# Patient Record
Sex: FEMALE | Race: WHITE | Employment: FULL TIME | URBAN - METROPOLITAN AREA
[De-identification: names, ages, dates, MRNs, and addresses within clinical notes are randomized per-mention and may not be internally consistent; named-entity substitution may affect disease eponyms.]

---

## 2022-06-22 ENCOUNTER — CLINICAL DOCUMENTATION (OUTPATIENT)
Dept: ORTHOPEDIC SURGERY | Age: 67
End: 2022-06-22

## 2022-06-22 NOTE — PROGRESS NOTES
REQUEST FROM JATIN ANDERS AT [facility]. THIS PAITENT HAD A SLIP AND FALL AT SCHOOL 6/21/22 AND NEEDS TO BE SEEN FOR LEFT LEG. JATIN WILL TRY AND GET THE NOTES TO ME BEFORE APPT ON 6/24/22. WC  Gordon Cox  [phone] FAX. [phone]

## 2022-06-24 ENCOUNTER — OFFICE VISIT (OUTPATIENT)
Dept: ORTHOPEDIC SURGERY | Age: 67
End: 2022-06-24

## 2022-06-24 VITALS — HEIGHT: 66 IN | BODY MASS INDEX: 29.73 KG/M2 | WEIGHT: 185 LBS

## 2022-06-24 DIAGNOSIS — S76.302A LEFT HAMSTRING INJURY, INITIAL ENCOUNTER: Primary | ICD-10-CM

## 2022-06-24 RX ORDER — FOLIC ACID 1 MG/1
TABLET ORAL EVERY 24 HOURS
COMMUNITY

## 2022-06-24 RX ORDER — SUMATRIPTAN 100 MG/1
TABLET, FILM COATED ORAL SEE ADMIN INSTRUCTIONS
COMMUNITY

## 2022-06-24 RX ORDER — DOXYCYCLINE 100 MG/1
TABLET ORAL
COMMUNITY
Start: 2022-05-04

## 2022-06-24 RX ORDER — LIDOCAINE 50 MG/G
PATCH TOPICAL
COMMUNITY
Start: 2022-06-21

## 2022-06-24 RX ORDER — LISINOPRIL AND HYDROCHLOROTHIAZIDE 20; 12.5 MG/1; MG/1
TABLET ORAL
COMMUNITY
Start: 2022-05-15

## 2022-06-24 RX ORDER — OMEPRAZOLE 40 MG/1
40 CAPSULE, DELAYED RELEASE ORAL DAILY
COMMUNITY
Start: 2022-06-16 | End: 2022-07-16

## 2022-06-24 RX ORDER — CELECOXIB 100 MG/1
100 CAPSULE ORAL 2 TIMES DAILY
COMMUNITY

## 2022-06-24 RX ORDER — METHYLPREDNISOLONE 4 MG/1
TABLET ORAL
COMMUNITY
Start: 2022-05-04

## 2022-06-24 RX ORDER — FLUTICASONE PROPIONATE 50 MCG
2 SPRAY, SUSPENSION (ML) NASAL DAILY
COMMUNITY
Start: 2022-05-04

## 2022-06-24 RX ORDER — LISINOPRIL 40 MG/1
25 TABLET ORAL DAILY
COMMUNITY

## 2022-06-24 RX ORDER — MELOXICAM 15 MG/1
TABLET ORAL
COMMUNITY
Start: 2022-06-21

## 2022-06-24 RX ORDER — CYCLOBENZAPRINE HCL 10 MG
TABLET ORAL
COMMUNITY
Start: 2022-06-21

## 2022-06-24 NOTE — PROGRESS NOTES
Name: Wandy Dominguez  YOB: 1955  Gender: female  MRN: [id number]    CC: Left hamstring    HPI:   06/21/2022: She slipped at work on a wet floor performing a partial split.  06/22/2022: Ms. Yudith Chacon describes treatment at [facility] with muscle relaxants, Mobic plus lidocaine patch  06/24/2022: She presents today with nausea related to her medication and pain    ROS/Meds/PSH/PMH/FH/SH: reviewed today    Tobacco:  reports that she has never smoked. She has never used smokeless tobacco.     Physical Examination:  Patient appears to be alert and oriented with acceptable appearance. No obvious distress or SOB  CV: appears to have acceptable vascular color and capillary refill  Neuro:appears to have mostly intact light touch sensation   Skin: Left posterior thigh soft tissue swelling  MS: Standing: Plantigrade: Gait protected left; she is able to independently ascend a step on her left  Left = more posterior medial and posterior lateral mid thigh pain; no hamstring gap; no anterior thigh pain  Left = no hip or knee pain; no motor deficit appreciated    XR: No indication today for x-rays  XR Impression:  As above      Reviewed Test/Records/Documents: As reflected above    Assessment:    Left thigh injury with suspected posterior medial hamstring bundle tear    Plan:   The patient and I discussed the above assessment. We explored treatment options.      Unfortunately she is still having a lot of discomfort after her suspected medial hamstring bundle tear   She has had some pain in the biceps femoris lateral hamstring but the majority is posterior medial bundle  MRI scan will be ordered to assess and define the degree of care    Advanced medical imaging: Left femoral shaft MRI scan: Assess for suspected posterior medial hamstring bundle tear; also possible lateral hamstring tear    DME: Discussed crutches and knee immobilizer but she is comfortable at least on her own at this point  We discussed thigh care and protection  PT: PT will be determined based on MRI scan     Medication - OTC meds prn: Due to the nausea, she will stop the Mobic and just use plain Tylenol. I offered Ultram and Neurontin but she would like to stay with Tylenol  Surgical discussion: No indication today for surgery  Follow up: After MRI scan  Work status: Out of work for 3 weeks    This note was created using Dragon voice recognition software which may result in errors of speech and spelling recognition and word/phrase syntax errors.

## 2022-06-24 NOTE — LETTER
[address] [address]  Phone: [phone]  Fax: [phone]    Tyrone Jorgensen MD        June 24, 2022     Patient: Arabella Osorio   YOB: 1955   Date of Visit: 6/24/2022       To Whom It May Concern: It is my medical opinion that Jessica Ates . Work status: Out of work for 3 weeks    If you have any questions or concerns, please don't hesitate to call.     Sincerely,        Tyrone Jorgensen MD

## 2022-06-27 ENCOUNTER — TELEPHONE (OUTPATIENT)
Dept: ORTHOPEDIC SURGERY | Age: 67
End: 2022-06-27

## 2022-06-27 ENCOUNTER — CLINICAL DOCUMENTATION (OUTPATIENT)
Dept: ORTHOPEDIC SURGERY | Age: 67
End: 2022-06-27

## 2022-06-27 ENCOUNTER — HOSPITAL ENCOUNTER (OUTPATIENT)
Dept: ULTRASOUND IMAGING | Age: 67
Discharge: HOME | End: 2022-06-30
Payer: COMMERCIAL

## 2022-06-27 DIAGNOSIS — S76.302A LEFT HAMSTRING INJURY, INITIAL ENCOUNTER: Primary | ICD-10-CM

## 2022-06-27 DIAGNOSIS — S76.302A LEFT HAMSTRING INJURY, INITIAL ENCOUNTER: ICD-10-CM

## 2022-06-27 PROCEDURE — 93971 EXTREMITY STUDY: CPT

## 2022-06-27 NOTE — TELEPHONE ENCOUNTER
Called pt as Radhames Moreno with [facility] stated her [facility] approved for her MRI to be done at [facility]. Scheduled MRI for today at [address] [place] [address].

## 2022-06-27 NOTE — TELEPHONE ENCOUNTER
Farnaz Warren with [facility] called to let us know that pt's Duplex US was negative.

## 2022-06-27 NOTE — TELEPHONE ENCOUNTER
She was seen Friday and an order was placed for an MRI through [employer]. There is nothing available until [address] Dr. Jhonny Roper never looked at her leg but now she has a lot of bruising on her leg and she is concerned about a blood clot. Also July 7 is really far out when she's in pain.

## 2022-07-06 ENCOUNTER — OFFICE VISIT (OUTPATIENT)
Dept: ORTHOPEDIC SURGERY | Age: 67
End: 2022-07-06

## 2022-07-06 DIAGNOSIS — S76.312D TEAR OF LEFT HAMSTRING, SUBSEQUENT ENCOUNTER: ICD-10-CM

## 2022-07-06 DIAGNOSIS — M79.671 RIGHT FOOT PAIN: Primary | ICD-10-CM

## 2022-07-06 DIAGNOSIS — M76.821 POSTERIOR TIBIAL TENDINITIS OF RIGHT LOWER EXTREMITY: ICD-10-CM

## 2022-07-06 RX ORDER — CELECOXIB 50 MG/1
50 CAPSULE ORAL
COMMUNITY

## 2022-07-06 RX ORDER — PREDNISONE 5 MG/1
TABLET ORAL
Qty: 1 EACH | Refills: 2 | Status: SHIPPED | OUTPATIENT
Start: 2022-07-06

## 2022-07-06 RX ORDER — LISINOPRIL 10 MG/1
10 TABLET ORAL DAILY
COMMUNITY

## 2022-07-06 NOTE — PROGRESS NOTES
Name: Jeanne Leggett  YOB: 1955  Gender: female  MRN: [id number]    06/24/2022: She was seen initially for left thigh injury  06/27/2022: MRI scans reveal hamstring tears. I called and discussed care with her.  07/06/2022: She is here to discuss her left side but also presents with a new problem in her right. She reports during her split, she injured the medial right foot and that foot pain has increased     HPI:   06/21/2022: She slipped at work on a wet floor performing a partial split.  06/22/2022: Ms. Mathews Degree describes treatment at [facility] with muscle relaxants, Mobic plus lidocaine patch  06/24/2022: She presented with nausea related to her medication and pain    ROS/Meds/PSH/PMH/FH/SH: reviewed today    Tobacco:  reports that she has never smoked. She has never used smokeless tobacco.     Physical Examination:  Patient appears to be alert and oriented with acceptable appearance. No obvious distress or SOB  CV: appears to have acceptable vascular color and capillary refill  Neuro:appears to have mostly intact light touch sensation   Skin: Right = mild insertional PTT area swelling: Left posterior thigh bruising   MS: Standing: Plantigrade: Gait is much better   Right = PTT insertional navicular region pain; no metatarsal or TMT pain  Right = good ankle/foot motion; 5/5 strength; no instability or crepitance  Left = moderate hamstring pain; no hamstring gap; no anterior thigh pain  Left = good hip and knee motion; 5/5 strength; no instability or crepitance    06/27/2022: MRI scan left knee without contrast: Dr. Yanick Forrester radiologic impression:  1. Grade 2 tears involving the musculotendinous junctions of the semitendinosus and long head biceps femoris muscles and associated intramuscular hematoma  2. Additional grade 2 tear involving the semimembranosus musculotendinous junction  3.   Intramuscular edema throughout the abductor compartment site with strains    02/27/2022: Left lower extremity duplex venous ultrasound radiologic impression: No DVT in the deep veins left leg    XR: Right side: Standing AP lateral mortise ankle plus AP oblique foot taken today with subluxed bunion; tarsometatarsal arthritis; no definite fracture appreciated but there are changes around base first metatarsal  XR Impression:  As above      Assessment:    Left thigh injury with abductor strains, grade 2 hamstring strains/tears  Right foot injury with insertional posterior tibial tendinitis, possible partial spring ligament tear    Plan:   The patient and I discussed the above assessment. We explored treatment options. Her MRI scan of the left reveals no surgical pathology but definite significant injuries to her hamstrings  Her right foot was also injured during her split but her left one took precedence  Her right foot is now hurting a lot due to protecting the left    Advanced medical imaging: If no resolution: Right ankle MRI scan: Assess for distal posterior tibial tendon tear    DME:   Left Reparel leg sleeve  Right Reparel ankle sleeve: Lace up ankle brace  We discussed care and protection  PT: PT prescribed 3 times a week for 4 weeks from left leg and right ankle and foot. Work comp will determine location    Medication - OTC meds prn: She uses Celebrex routinely  Prescribed: Prednisone 5 mg Sterapred pack; 48; take per package insert; she knows not to use Celebrex while on Prednisone  Surgical discussion: No indication today for hamstring surgery: Future possible posterior tibial tendon depending on her response  Follow up: 3 weeks  Work status: She feels she can manage her regular work    This note was created using Dragon voice recognition software which may result in errors of speech and spelling recognition and word/phrase syntax errors.

## 2022-07-06 NOTE — PROGRESS NOTES
The patient was prescribed a Wraptor brace for the patient's rightfoot. The patient wears a size 8 shoe and I fitted the patient with a M brace. I explained how to fit the brace properly by pulling the lace tabs across top of foot first then under arch and lastly pulling the strap up firmly and attaching to the lateral Velcro strip. Thus forming a figure 8 across the ankle joint. Once the figure 8 is completed they are to secure the top (short circumferential) straps to help avoid the straps from loosening with normal wear. The patient was able to demonstrate proper fitting in office to ensure compliance with device and acknowledged satisfaction with current fit. The patient was also prescribed a Reparel ankle sleeve for the right foot. She also received a Reparel leg sleeve for the left leg. Patient read and signed documenting they understand and agree to [facility]'s current DME return policy.

## 2022-07-06 NOTE — LETTER
DME Patient Authorization Form    Name: Jose Ferris  :   MRN: [id number]   Age: 77 y.o. Gender: female  Delivery Address: [facility]     Diagnosis:     ICD-10-CM    1. Right foot pain  M79.671 XR ANKLE RIGHT (MIN 3 VIEWS)     XR FOOT RIGHT (2 VIEWS)   2. Tear of left hamstring, subsequent encounter  S76.312D    3. Posterior tibial tendinitis of right lower extremity  M76.821         Requested DME:  Knee Sleeve () ( Leg sleeve) X 1 - Left  Reparel Ankle Undergarment ()  Wraptor Ankle Brace ()  X 1 - Right      Clinical Notes:     **Indicates non-covered items by insurance. Payment expected on date of service. Electronically signed by  Provider: ___Bernard Vo MD  Date: 2022                             [facility] Tax ID # [id number]        Durable Medical Equipment and/or Orthotics Patient Consent     I understand that my physician has prescribed this medical supply as part of my treatment plan as a matter of Medical Necessity.  I understand that I have a choice in where I receive my prescribed orthopedic supplies and/or services.  I authorize [facility] to furnish this service/product and to provide my insurance carrier with any information requested in order to process for payment.  I instruct my insurance carrier to pay [facility] directly for these services/products.  I understand that my insurance carrier may deny payment for this supply because it is a non-covered item, deemed not medically necessary or considered experimental.   I understand that any cost not covered by my insurance carrier will be solely my financial responsibility.  I have received the Supplier Standards and have reviewed them.    I have received the prescribed item and have been fully instructed on the proper use of the above services/products.    ______ (Patient Initials) I understand that all DME items are non-returnable after being dispensed. Items still in sealed packaging may be returned up to 14 days after purchasing. [address] will replace items that are defective.    ______ (Patient Initials) I understand that [facility] will not file a claim with my insurance carrier for this service/product and I am waiving my right to file a claim on my own for this service/product with my insurance company as this item is NON-COVERED (Denoted by the **) by my Insurance company/policy. ______ (Patient Initials) I understand that I am responsible to bring my equipment to the hospital for any surgery. ______________________________________________  ________________________  Patient / Karissaatulsergo Joness            Thank you for considering [address] Your physician has prescribed specific medical equipment or devices for your home use. The following describes your rights and responsibilities as our customer. Right to Choose Providers: You have a choice regarding which company supplies your home medical equipment and devices, and to consult your physician in this decision. You may choose a medical supply store, a home medical equipment provider, or a specialist such as [facility]. [facility] will coordinate with your physician to provide the medical equipment or devices prescribed for your home use. Right to Service:  You have the right to considerate, respectful and nondiscriminatory care. You have the right to receive accurate and easily understood information about your health care. If you speak a foreign language, or don't understand the discussions, assistance will be provided to allow you to make informed health care decisions.   You have the right to know your treatment options and to participate in decisions about your care, including the right to accept or refuse treatment. You have the right to expect a reasonable response to your requests for treatment or service. You have the right to talk in confidence with health care providers and to have your health care information protected. You have the right to receive an explanation of your bill. You have the right to complain about the service or product you receive. Patient Responsibilities:  Please provide complete and accurate information about your health insurance benefits and make arrangements for the timely payment of your bill. [facility] will, if possible, assume responsibility for billing your insurance (Medicare, Medicaid and commercial) for the prescribed equipment or devices. If your policy does not cover the prescribed product, or only covers a portion of the bill, you are responsible for any remaining balance. Return and Exchange Policy:  [facility] will honor published  Warranties for products. [facility] will accept returns or exchanges within 14 days from the date of receipt, providin) the product must be in new condition; 2) receipt as required; and 3) used disposable and hygiene products may only be returned due to a defective product. Note: Refunds will be issued in a timely manner, please allow 4-6 weeks for processing. Complaint Procedures and DME Consumer Protection Resources:  [facility] values you as a customer, and is committed to resolving patient concerns. This commitment includes understanding and documenting your concerns, conducting a review of internal procedures, and providing you with an explanation and resolution to your concerns. Should you have any questions about our services or billing process, please contact our office at (practice phone number).   If we are unable to resolve the concern, you have the right to direct comments to the office of Consumer Protection, in the [facility] Department of [facility] or the [facility] office, without fear of repercussion. DMEPOS SUPPLIER STANDARDS    A supplier must be in compliance with all applicable Federal and [facility] and regulatory requirements. A supplier must provide complete and accurate information on the DMEPOS supplier application. Any changes to this information must be reported to the [facility] [employer] within 30 days. An authorized individual (one whose signature is binding) must sign the application for billing privileges. A supplier must fill orders from its own inventory, or must contract with other companies for the purchase of items necessary to fill the order. A supplier may not contract with any entity that is currently excluded from the Medicare program, any [facility] program, or from any other Federal procurement or Nonprocurement programs. A supplier must advise beneficiaries that they may rent or purchase inexpensive or routinely purchased durable medical equipment, and of the purchase option for capped rental equipment. A supplier must notify beneficiaries of warranty coverage and honor all warranties under applicable State Law, and repair or replace free of charge Medicare covered items that are under warranty. A supplier must maintain a physical facility on an appropriate site. A supplier must permit CMS, or its agents to conduct on-site inspections to ascertain the supplier's compliance with these standards. The supplier location must be accessible to beneficiaries during reasonable business hours, and must maintain a visible sign and posted hours of operation. A supplier must maintain a primary business telephone listed under the name of the business in a [employer] or a toll free number available through directory assistance. The exclusive use of a beeper, answering machine or cell phone is prohibited.   A supplier must have comprehensive liability insurance in the amount of at least $300,000 that covers both the supplier's place of business and all customers and employees of the supplier. If the supplier manufactures its own items, this insurance must also cover product liability and completed operations. A supplier must agree not to initiate telephone contact with beneficiaries, with a few exceptions allowed. This standard prohibits suppliers from calling beneficiaries in order to solicit new business. A supplier is responsible for delivery and must instruct beneficiaries on use of Medicare covered items, and maintain proof of delivery. A supplier must answer questions, and respond to complaints of the beneficiaries, and maintain documentation of such contacts. A supplier must maintain and replace at no charge or repair directly, or through a service contract with another company, Medicare covered items it has rented to beneficiaries. A supplier must accept returns of substandard (less than full quality for the particular item) or unsuitable items (inappropriate for the beneficiary at the time it was fitted and rented or sold) from beneficiaries. A supplier must disclose these supplier standards to each beneficiary to whom it supplies a Medicare-covered item. A supplier must disclose to the government any person having ownership, financial, or control interest in the supplier. A supplier must not convey or reassign a supplier number; i.e., the supplier may not sell or allow another entity to use its Medicare billing number. A supplier must have a complaint resolution protocol established to address beneficiary complaints that relate to these standards. A record of these complaints must be maintained at the physical facility. Complaint records must include: the name, address, telephone number and health insurance claim number of the beneficiary, a summary of the complaint, and any action taken to resolve it.   A supplier must agree to furnish CMS any information required by the Medicare statute and implementing regulations. A supplier of DMEPOS and other items and services must be accredited by a CMS-approved accreditation organization in order to receive and retain a supplier billing number. The accreditation must indicate the specific products and services, for which the supplier is accredited in order for the supplier to receive payment for those specific products and services. A DMEPOS supplier must notify their accreditation organization when a new DMEPOS location is opened. The accreditation organization may accredit the new supplier location for three months after it is operational without requiring a new site visit. All DMEPOS supplier locations, whether owned or subcontracted, must meet the [employer] and be separately accredited in order to bill Medicare. An accredited supplier may be denied enrollment or their enrollment may be revoked, if CMS determines that they are not in compliance with the DMEPOS quality standards. A DMEPOS supplier must disclose upon enrollment all products and services, including the addition of new product lines for which they are seeking accreditation. If a new product line is added after enrollment, the DMEPOS supplier will be responsible for notifying the accrediting body of the new product so that the DMEPOS supplier can be re-surveyed and accredited for these new products. Must meet the surety bond requirements specified in 42 C. F.R. 424.57(c). Implementation date- May 4, 2009. A supplier must obtain oxygen from a state-licensed oxygen supplier. A supplier must maintain ordering and referring documentation consistent with provisions found in 42 C. F.R. 424.516(f). DMEPOS suppliers are prohibited from sharing a practice location with certain other Medicare providers and suppliers. DMEPOS suppliers must remain open to the public for a minimum of 30 hours per week with certain exceptions.

## 2022-07-27 ENCOUNTER — OFFICE VISIT (OUTPATIENT)
Dept: ORTHOPEDIC SURGERY | Age: 67
End: 2022-07-27

## 2022-07-27 DIAGNOSIS — S76.312D TEAR OF LEFT HAMSTRING, SUBSEQUENT ENCOUNTER: Primary | ICD-10-CM

## 2022-07-27 DIAGNOSIS — M76.821 POSTERIOR TIBIAL TENDINITIS OF RIGHT LOWER EXTREMITY: ICD-10-CM

## 2022-07-27 NOTE — PROGRESS NOTES
Name: Angel Stevens  YOB: 1955  Gender: female  MRN: [id number]    07/06/2022: Last visit with me  07/27/2022: She reports feeling better and has made excellent progress in PT     HPI:   06/21/2022: She slipped at work on a wet floor performing a partial split.  06/22/2022: Ms. Bernadette Hernandez describes treatment at [facility] with muscle relaxants, Mobic plus lidocaine patch  06/24/2022: She presented with nausea related to her medication and pain    ROS/Meds/PSH/PMH/FH/SH: reviewed today    Tobacco:  reports that she has never smoked. She has never used smokeless tobacco.     Physical Examination:  Patient appears to be alert and oriented with acceptable appearance. No obvious distress or SOB  CV: appears to have acceptable vascular color and capillary refill  Neuro:appears to have mostly intact light touch sensation   Skin: No soft tissue swelling  MS: Standing: Plantigrade: Gait is full  Right = resolved PTT insertional navicular region pain; no metatarsal or TMT pain  Right = good ankle/foot motion; 5/5 strength; no instability or crepitance  Left = resolved hamstring pain; good hip/knee motion; 5/5 strength; no instability or crepitance    06/27/2022: MRI scan left knee without contrast: Dr. Saul Tilley radiologic impression:  1. Grade 2 tears involving the musculotendinous junctions of the semitendinosus and long head biceps femoris muscles and associated intramuscular hematoma  2. Additional grade 2 tear involving the semimembranosus musculotendinous junction  3.   Intramuscular edema throughout the abductor compartment site with strains    02/27/2022: Left lower extremity duplex venous ultrasound radiologic impression: No DVT in the deep veins left leg    XR: Right side: Standing AP lateral mortise ankle plus AP oblique foot taken 07/06/2022 with subluxed bunion; tarsometatarsal arthritis; no definite fracture appreciated but there are changes around base first metatarsal  XR Impression:  As above      Assessment:    Left thigh injury with abductor strains, grade 2 hamstring strains/tears - resolving   Right foot injury with insertional posterior tibial tendinitis - resolving     Plan:   The patient and I discussed the above assessment. We explored treatment options. She has made excellent progress with PT. She completed the prior prescribed: Prednisone 5 mg Sterapred pack  She no longer uses the Reparel leg sleeve,Reparel ankle sleeve or lace up ankle brace  She feels that she is doing well; therefore, I will assign MMI and discharge  She will complete her PT    No anticipated future need for: Medication; MRI scan; bracing; surgery    Ratings:   0% to the right lower extremity  0% to the left lower extremity    Work status: Regular    This note was created using Dragon voice recognition software which may result in errors of speech and spelling recognition and word/phrase syntax errors.